# Patient Record
Sex: MALE | Race: WHITE | NOT HISPANIC OR LATINO | Employment: OTHER | URBAN - METROPOLITAN AREA
[De-identification: names, ages, dates, MRNs, and addresses within clinical notes are randomized per-mention and may not be internally consistent; named-entity substitution may affect disease eponyms.]

---

## 2018-10-02 ENCOUNTER — HOSPITAL ENCOUNTER (EMERGENCY)
Facility: HOSPITAL | Age: 58
Discharge: HOME | End: 2018-10-02
Attending: EMERGENCY MEDICINE
Payer: COMMERCIAL

## 2018-10-02 VITALS
TEMPERATURE: 97 F | WEIGHT: 160 LBS | RESPIRATION RATE: 18 BRPM | DIASTOLIC BLOOD PRESSURE: 79 MMHG | SYSTOLIC BLOOD PRESSURE: 118 MMHG | HEART RATE: 71 BPM | HEIGHT: 71 IN | BODY MASS INDEX: 22.4 KG/M2 | OXYGEN SATURATION: 98 %
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
## 2018-10-02 DIAGNOSIS — S42.001A CLOSED DISPLACED FRACTURE OF RIGHT CLAVICLE, UNSPECIFIED PART OF CLAVICLE, INITIAL ENCOUNTER: Primary | ICD-10-CM

## 2018-10-02 PROCEDURE — 23500 CLTX CLAVICULAR FX W/O MNPJ: CPT

## 2018-10-02 PROCEDURE — 73030 X-RAY EXAM OF SHOULDER: CPT | Mod: 26,RT,, | Performed by: RADIOLOGY

## 2018-10-02 PROCEDURE — 73030 X-RAY EXAM OF SHOULDER: CPT | Mod: TC,FY,RT

## 2018-10-02 PROCEDURE — 99284 EMERGENCY DEPT VISIT MOD MDM: CPT | Mod: 25

## 2018-10-02 PROCEDURE — 63600175 PHARM REV CODE 636 W HCPCS: Performed by: EMERGENCY MEDICINE

## 2018-10-02 PROCEDURE — 96372 THER/PROPH/DIAG INJ SC/IM: CPT

## 2018-10-02 RX ORDER — TRAMADOL HYDROCHLORIDE 50 MG/1
50 TABLET ORAL EVERY 6 HOURS PRN
Qty: 12 TABLET | Refills: 0 | Status: SHIPPED | OUTPATIENT
Start: 2018-10-02 | End: 2018-10-12

## 2018-10-02 RX ORDER — GABAPENTIN 300 MG/1
300 CAPSULE ORAL 3 TIMES DAILY
COMMUNITY
End: 2020-01-02

## 2018-10-02 RX ORDER — KETOROLAC TROMETHAMINE 30 MG/ML
60 INJECTION, SOLUTION INTRAMUSCULAR; INTRAVENOUS
Status: COMPLETED | OUTPATIENT
Start: 2018-10-02 | End: 2018-10-02

## 2018-10-02 RX ADMIN — KETOROLAC TROMETHAMINE 60 MG: 30 INJECTION, SOLUTION INTRAMUSCULAR at 02:10

## 2018-10-02 NOTE — ED NOTES
DISCHARGE INSTRUCTIONS GIVEN, DISCUSSED MEDIATIONS AND FOLLOW UP CARE, PATIENT VERBALIZED UNDERSTANDING, NO QUESTIONS OR COMPLAINTS AT TIME, ENCOURAGED TO RETURN FOR NEW OR WORSENING SYMPTOMS. PATIENT ASSISTED TO REGISTRATION VIA SID/MAKSIM EPPS RN

## 2018-10-02 NOTE — ED PROVIDER NOTES
Encounter Date: 10/2/2018       History     Chief Complaint   Patient presents with    Shoulder Pain     trip and fall c/o R shoulder pain     Patient then returned chief complaint of right shoulder pain.  Patient stated fell an endotracheal was having severe pain.  Patient stated pain was an aching sensation rated a 10 on a scale of 1-10.  Patient stated worse with movement.  Patient stated pain it on relief would not moving.  Denies any other injuries.  Denies any head or neck pain.  Patient denies any numbness or tingling that is 8 able to move his hand.          Review of patient's allergies indicates:   Allergen Reactions    Penicillins      Past Medical History:   Diagnosis Date    Neuropathy      History reviewed. No pertinent surgical history.  History reviewed. No pertinent family history.  Social History     Tobacco Use    Smoking status: Current Every Day Smoker     Packs/day: 1.00     Types: Cigarettes   Substance Use Topics    Alcohol use: Yes     Alcohol/week: 3.6 oz     Types: 6 Cans of beer per week     Comment: daily    Drug use: No     Review of Systems   Constitutional: Negative for fever.   HENT: Negative for sore throat.    Respiratory: Negative for shortness of breath.    Cardiovascular: Negative for chest pain.   Gastrointestinal: Negative for nausea.   Genitourinary: Negative for dysuria.   Musculoskeletal: Positive for arthralgias and joint swelling. Negative for back pain.   Skin: Negative for rash.   Neurological: Negative for weakness.   Hematological: Does not bruise/bleed easily.       Physical Exam     Initial Vitals   BP Pulse Resp Temp SpO2   10/02/18 0114 10/02/18 0114 10/02/18 0114 10/02/18 0117 10/02/18 0114   118/79 71 18 97.4 °F (36.3 °C) 98 %      MAP       --                Physical Exam    Constitutional: Vital signs are normal. He appears well-developed and well-nourished. He appears cachectic. He is Obese .   HENT:   Head: Normocephalic and atraumatic.   Eyes: Lids  are normal. Lids are everted and swept, no foreign bodies found.   Neck: Trachea normal, normal range of motion and phonation normal. Neck supple.   Cardiovascular: Normal rate, regular rhythm and normal pulses.   Abdominal: Soft. Normal appearance and bowel sounds are normal.   Musculoskeletal: Normal range of motion.   Pain swelling tenderness to right clavicle decreased range of motion of the right upper extremity secondary to pain of the clavicle.  Good sensory motor good 2 point discrimination good capillary refill   Lymphadenopathy:        Head (right side): No submental, no tonsillar, no preauricular, no posterior auricular and no occipital adenopathy present.        Head (left side): No tonsillar, no preauricular and no posterior auricular adenopathy present.     He has no cervical adenopathy.        Right cervical: No deep cervical adenopathy present.       Left cervical: No superficial cervical adenopathy present.   Neurological: He is alert and oriented to person, place, and time. No cranial nerve deficit or sensory deficit. GCS eye subscore is 4. GCS verbal subscore is 5. GCS motor subscore is 6.   Skin: Skin is warm, dry and intact.   Psychiatric: He has a normal mood and affect. His speech is normal and behavior is normal. Cognition and memory are normal.         ED Course   Procedures  Labs Reviewed - No data to display       Imaging Results    None       X-Rays:   Independently Interpreted Readings:   Other Readings:  Right shoulder x-ray read by me independently shows of the mid clavicler fracture    Medical Decision Making:   Initial Assessment:   Patient did present by ambulance after a fall at home was having pain to his right shoulder stated worse with movement.  X-rays were done to rule out possible fractures or dislocations which did show a fractured clavicle  ED Management:  Patient was given a shot of Toradol secondary to not having a ride home was discharged home with Ultram placed in a  jack with follow-up with Orthopedics                      Clinical Impression:   The primary encounter diagnosis was Closed displaced fracture of right clavicle, unspecified part of clavicle, initial encounter. A diagnosis of Fall was also pertinent to this visit.                             Joe Jenkins DO  10/02/18 0146

## 2018-10-03 ENCOUNTER — HOSPITAL ENCOUNTER (OUTPATIENT)
Dept: RADIOLOGY | Facility: HOSPITAL | Age: 58
Discharge: HOME | End: 2018-10-03
Attending: ORTHOPAEDIC SURGERY
Payer: COMMERCIAL

## 2018-10-03 ENCOUNTER — OFFICE VISIT (OUTPATIENT)
Dept: ORTHOPEDICS | Facility: CLINIC | Age: 58
End: 2018-10-03
Payer: COMMERCIAL

## 2018-10-03 VITALS
BODY MASS INDEX: 22.4 KG/M2 | HEART RATE: 74 BPM | HEIGHT: 71 IN | SYSTOLIC BLOOD PRESSURE: 126 MMHG | WEIGHT: 160 LBS | DIASTOLIC BLOOD PRESSURE: 79 MMHG

## 2018-10-03 DIAGNOSIS — S42.024A CLOSED NONDISPLACED FRACTURE OF SHAFT OF RIGHT CLAVICLE, INITIAL ENCOUNTER: ICD-10-CM

## 2018-10-03 DIAGNOSIS — M25.511 RIGHT SHOULDER PAIN, UNSPECIFIED CHRONICITY: Primary | ICD-10-CM

## 2018-10-03 DIAGNOSIS — M25.511 RIGHT SHOULDER PAIN, UNSPECIFIED CHRONICITY: ICD-10-CM

## 2018-10-03 DIAGNOSIS — M19.011 ARTHRITIS OF RIGHT ACROMIOCLAVICULAR JOINT: ICD-10-CM

## 2018-10-03 PROCEDURE — 99999 PR PBB SHADOW E&M-NEW PATIENT-LVL III: CPT | Mod: PBBFAC,,, | Performed by: ORTHOPAEDIC SURGERY

## 2018-10-03 PROCEDURE — 23500 CLTX CLAVICULAR FX W/O MNPJ: CPT | Mod: RT,S$GLB,, | Performed by: ORTHOPAEDIC SURGERY

## 2018-10-03 PROCEDURE — 73030 X-RAY EXAM OF SHOULDER: CPT | Mod: 26,RT,, | Performed by: RADIOLOGY

## 2018-10-03 PROCEDURE — 3008F BODY MASS INDEX DOCD: CPT | Mod: S$GLB,,, | Performed by: ORTHOPAEDIC SURGERY

## 2018-10-03 PROCEDURE — 73030 X-RAY EXAM OF SHOULDER: CPT | Mod: TC,PN,RT

## 2018-10-03 PROCEDURE — 99203 OFFICE O/P NEW LOW 30 MIN: CPT | Mod: 57,S$GLB,, | Performed by: ORTHOPAEDIC SURGERY

## 2018-10-03 RX ORDER — GABAPENTIN 300 MG/1
300 CAPSULE ORAL 3 TIMES DAILY
COMMUNITY
End: 2020-01-02

## 2018-10-03 NOTE — PROGRESS NOTES
Subjective:      Patient ID: Kev Bauer is a 58 y.o. male.    Chief Complaint: Pain of the Right Shoulder    Referring Provider: No referring provider defined for this encounter.    HPI:  Mr. Bauer is a 58-year-old right-hand-dominant male who presented today for evaluation of 2 days of right shoulder pain which began after he fell on his shoulder while walking on 10/02/2018.  He has not taken NSAIDs done physical therapy nor had injections.  Abduction increases his pain and apparently nothing improves it.    Past medical history:  Diverticulitis  Headaches  Peripheral neuropathy    Past surgical history:  Colonoscopy    Review of patient's allergies indicates:  No Known Allergies    Social History     Occupational History         Tobacco Use    Smoking status: Current Every Day Smoker     Packs/day: 2.00     Types: Cigarettes   Substance and Sexual Activity    Alcohol use: Yes     Alcohol/week: 25.2 oz     Types: 42 Cans of beer per week    Drug use: Not on file    Sexual activity: Not on file      Family History   Problem Relation Age of Onset    Lung cancer Mother     Cancer Father     Heart disease Father     Cancer Brother        Previous Hospitalizations:  Diverticulitis.    ROS:   Review of Systems   Constitution: Negative for chills and fever.   Eyes: Negative for blurred vision.   Cardiovascular: Negative for chest pain.   Respiratory: Negative for cough.    Endocrine: Negative for polydipsia.   Hematologic/Lymphatic: Does not bruise/bleed easily.   Skin: Negative for itching.   Musculoskeletal: Positive for falls.   Gastrointestinal: Negative for heartburn.   Genitourinary: Negative for flank pain.   Neurological: Positive for headaches. Negative for numbness and seizures.   Psychiatric/Behavioral: Negative for depression.   Allergic/Immunologic: Negative for environmental allergies.           Objective:      Physical Exam:   General: AAOx3.  No acute distress  HEENT:  Normocephalic, PEARLA EOMI, fair Dentition  Neck: Supple, No JVD  Chest: Symetric, equal excursion on inspiration  Abdomen: Soft NTND  Vascular:  Pulses intact and equal bilaterally.  Capillary refill less than 3 seconds and equal bilaterally  Neurologic:  Pinprick and soft touch intact and equal bilaterally  Integment:  No ecchymosis, no errythema  Extremity:  Shoulder:  Decreased motion right shoulder secondary tenderness. Forward flexion/abduction right shoulder 0/90°, left shoulder 0/175 degrees. Internal rotation right shoulder 0 5 left shoulder T12.  External rotation 0/10° right shoulder, 0/20 degrees left shoulder.  Tender with motion right shoulder.  Nontender over the acromioclavicular joint.  Nontender with palpation bicipital groove.  Tender over the midshaft of the right clavicle.  Swelling at the mid shaft of the right clavicle.  Radiography:  Personally reviewed x-rays of the right shoulder completed on 10/03/2018 which showed a relatively nondisplaced midshaft clavicle fracture and acromioclavicular arthritis.      Assessment:       Impression:      1. Closed nondisplaced fracture of shaft of right clavicle, initial encounter    2. Arthritis of right acromioclavicular joint          Plan:       1.  Discussed physical examination and radiographic findings with the patient. Kev understands that he has a fracture of the clavicle of his right shoulder and he can be treated conservatively.  He understands if he displaces his fracture or develops a nonunion surgical intervention could be warranted.  2.  Place in a figure-of-eight splint.  3.  Patient understands he must maintain his shoulders in the  attention posture.  4.  Norco 7.5/325, 1 p.o. q.4-6 hours p.r.n. pain, number 30, refill 0.  5.  One-handed activities with the left hand only.  6.  Apply ice to right shoulder patient was instructed on how to apply ice.  7.  Follow up in 1 month with an x-ray of the right clavicle.

## 2018-10-17 ENCOUNTER — TELEPHONE (OUTPATIENT)
Dept: ORTHOPEDICS | Facility: CLINIC | Age: 58
End: 2018-10-17

## 2018-10-17 NOTE — TELEPHONE ENCOUNTER
Called patient to inform him that his disability paperwork is ready for  at the [place] location and that Dr. Mccann documented for him to return to work on 10/18/2018. I cannot fax disability paperwork because the section of the paperwork that requires his signature for me to share his medical information is not signed. No answer at phone number [phone] and no voicemail set up. Unable to contact patient.

## 2018-10-18 ENCOUNTER — TELEPHONE (OUTPATIENT)
Dept: ORTHOPEDICS | Facility: CLINIC | Age: 58
End: 2018-10-18

## 2018-10-18 NOTE — TELEPHONE ENCOUNTER
Called patient to inform him that his disability paperwork is ready for  at the [place] location and that Dr. Mccann documented for him to return to work on 10/18/2018. I cannot fax disability paperwork because the section of the paperwork that requires his signature for me to share his medical information is not signed.     No answer at phone number [phone] and no voicemail set up. Unable to contact patient X 2 attempts.

## 2018-10-19 ENCOUNTER — TELEPHONE (OUTPATIENT)
Dept: ORTHOPEDICS | Facility: CLINIC | Age: 58
End: 2018-10-19

## 2018-10-19 NOTE — TELEPHONE ENCOUNTER
Called patient to inform him that his disability paperwork is ready for  at the [place] location and that Dr. Mccann documented for him to return to work on 10/18/2018. I cannot fax disability paperwork because the section of the paperwork that requires his signature for me to share his medical information is not signed.      No answer at phone number [phone] and no voicemail set up. Unable to contact patient X 3 attempts.     I called [facility] at number ([phone]) and explained that I cannot fax patient's information to them because patient did not sign the form that was attached to the paperwork that allows me to fax his information. I explained that I cannot reach the patient. I was informed that patient's case has been extended to Oct 24, 2018.    Letter explaining situation and paperwork mailed to patient today.

## 2019-12-22 ENCOUNTER — HOSPITAL ENCOUNTER (EMERGENCY)
Facility: HOSPITAL | Age: 59
Discharge: HOME | End: 2019-12-22
Attending: EMERGENCY MEDICINE

## 2019-12-22 VITALS
BODY MASS INDEX: 23.03 KG/M2 | DIASTOLIC BLOOD PRESSURE: 60 MMHG | HEIGHT: 72 IN | RESPIRATION RATE: 20 BRPM | SYSTOLIC BLOOD PRESSURE: 111 MMHG | HEART RATE: 88 BPM | WEIGHT: 170 LBS | TEMPERATURE: 99 F | OXYGEN SATURATION: 94 %

## 2019-12-22 DIAGNOSIS — S82.201A CLOSED FRACTURE OF RIGHT TIBIA AND FIBULA, INITIAL ENCOUNTER: Primary | ICD-10-CM

## 2019-12-22 DIAGNOSIS — S82.401A CLOSED FRACTURE OF RIGHT TIBIA AND FIBULA, INITIAL ENCOUNTER: Primary | ICD-10-CM

## 2019-12-22 DIAGNOSIS — R52 PAIN: ICD-10-CM

## 2019-12-22 LAB
ALBUMIN SERPL BCP-MCNC: 4.4 G/DL (ref 3.5–5.2)
ALP SERPL-CCNC: 50 U/L (ref 55–135)
ALT SERPL W/O P-5'-P-CCNC: 25 U/L (ref 10–44)
ANION GAP SERPL CALC-SCNC: 8 MMOL/L (ref 8–16)
AST SERPL-CCNC: 29 U/L (ref 10–40)
BASOPHILS # BLD AUTO: 0.08 K/UL (ref 0–0.2)
BASOPHILS NFR BLD: 0.6 % (ref 0–1.9)
BILIRUB SERPL-MCNC: 0.8 MG/DL (ref 0.1–1)
BUN SERPL-MCNC: 10 MG/DL (ref 6–20)
CALCIUM SERPL-MCNC: 8.5 MG/DL (ref 8.7–10.5)
CHLORIDE SERPL-SCNC: 103 MMOL/L (ref 95–110)
CO2 SERPL-SCNC: 26 MMOL/L (ref 23–29)
CREAT SERPL-MCNC: 0.8 MG/DL (ref 0.5–1.4)
DIFFERENTIAL METHOD: ABNORMAL
EOSINOPHIL # BLD AUTO: 0.1 K/UL (ref 0–0.5)
EOSINOPHIL NFR BLD: 0.9 % (ref 0–8)
ERYTHROCYTE [DISTWIDTH] IN BLOOD BY AUTOMATED COUNT: 12.1 % (ref 11.5–14.5)
EST. GFR  (AFRICAN AMERICAN): >60 ML/MIN/1.73 M^2
EST. GFR  (NON AFRICAN AMERICAN): >60 ML/MIN/1.73 M^2
GLUCOSE SERPL-MCNC: 114 MG/DL (ref 70–110)
HCT VFR BLD AUTO: 44.4 % (ref 40–54)
HGB BLD-MCNC: 15.2 G/DL (ref 14–18)
IMM GRANULOCYTES # BLD AUTO: 0.07 K/UL (ref 0–0.04)
IMM GRANULOCYTES NFR BLD AUTO: 0.5 % (ref 0–0.5)
LYMPHOCYTES # BLD AUTO: 1.8 K/UL (ref 1–4.8)
LYMPHOCYTES NFR BLD: 12.4 % (ref 18–48)
MCH RBC QN AUTO: 33.6 PG (ref 27–31)
MCHC RBC AUTO-ENTMCNC: 34.2 G/DL (ref 32–36)
MCV RBC AUTO: 98 FL (ref 82–98)
MONOCYTES # BLD AUTO: 1 K/UL (ref 0.3–1)
MONOCYTES NFR BLD: 6.8 % (ref 4–15)
NEUTROPHILS # BLD AUTO: 11.2 K/UL (ref 1.8–7.7)
NEUTROPHILS NFR BLD: 78.8 % (ref 38–73)
NRBC BLD-RTO: 0 /100 WBC
PLATELET # BLD AUTO: 267 K/UL (ref 150–350)
PMV BLD AUTO: 8.8 FL (ref 9.2–12.9)
POTASSIUM SERPL-SCNC: 4.3 MMOL/L (ref 3.5–5.1)
PROT SERPL-MCNC: 7.1 G/DL (ref 6–8.4)
RBC # BLD AUTO: 4.53 M/UL (ref 4.6–6.2)
SODIUM SERPL-SCNC: 137 MMOL/L (ref 136–145)
WBC # BLD AUTO: 14.26 K/UL (ref 3.9–12.7)

## 2019-12-22 PROCEDURE — 90715 TDAP VACCINE 7 YRS/> IM: CPT | Performed by: EMERGENCY MEDICINE

## 2019-12-22 PROCEDURE — 25000003 PHARM REV CODE 250: Performed by: EMERGENCY MEDICINE

## 2019-12-22 PROCEDURE — 99284 EMERGENCY DEPT VISIT MOD MDM: CPT | Mod: 25

## 2019-12-22 PROCEDURE — 80053 COMPREHEN METABOLIC PANEL: CPT

## 2019-12-22 PROCEDURE — 85025 COMPLETE CBC W/AUTO DIFF WBC: CPT

## 2019-12-22 PROCEDURE — 63600175 PHARM REV CODE 636 W HCPCS: Performed by: EMERGENCY MEDICINE

## 2019-12-22 PROCEDURE — 29515 APPLICATION SHORT LEG SPLINT: CPT | Mod: RT

## 2019-12-22 PROCEDURE — 96374 THER/PROPH/DIAG INJ IV PUSH: CPT

## 2019-12-22 PROCEDURE — 90471 IMMUNIZATION ADMIN: CPT | Performed by: EMERGENCY MEDICINE

## 2019-12-22 PROCEDURE — 96375 TX/PRO/DX INJ NEW DRUG ADDON: CPT

## 2019-12-22 RX ORDER — ONDANSETRON 2 MG/ML
4 INJECTION INTRAMUSCULAR; INTRAVENOUS
Status: COMPLETED | OUTPATIENT
Start: 2019-12-22 | End: 2019-12-22

## 2019-12-22 RX ORDER — HYDROCODONE BITARTRATE AND ACETAMINOPHEN 10; 325 MG/1; MG/1
1 TABLET ORAL
Status: COMPLETED | OUTPATIENT
Start: 2019-12-22 | End: 2019-12-22

## 2019-12-22 RX ORDER — HYDROMORPHONE HYDROCHLORIDE 1 MG/ML
1 INJECTION, SOLUTION INTRAMUSCULAR; INTRAVENOUS; SUBCUTANEOUS
Status: COMPLETED | OUTPATIENT
Start: 2019-12-22 | End: 2019-12-22

## 2019-12-22 RX ORDER — HYDROCODONE BITARTRATE AND ACETAMINOPHEN 10; 325 MG/1; MG/1
1 TABLET ORAL EVERY 6 HOURS PRN
Qty: 12 TABLET | Refills: 0 | Status: SHIPPED | OUTPATIENT
Start: 2019-12-22 | End: 2019-12-23 | Stop reason: SDUPTHER

## 2019-12-22 RX ADMIN — HYDROMORPHONE HYDROCHLORIDE 1 MG: 1 INJECTION, SOLUTION INTRAMUSCULAR; INTRAVENOUS; SUBCUTANEOUS at 12:12

## 2019-12-22 RX ADMIN — ONDANSETRON HYDROCHLORIDE 4 MG: 2 INJECTION, SOLUTION INTRAMUSCULAR; INTRAVENOUS at 12:12

## 2019-12-22 RX ADMIN — HYDROCODONE BITARTRATE AND ACETAMINOPHEN 1 TABLET: 10; 325 TABLET ORAL at 02:12

## 2019-12-22 RX ADMIN — CLOSTRIDIUM TETANI TOXOID ANTIGEN (FORMALDEHYDE INACTIVATED), CORYNEBACTERIUM DIPHTHERIAE TOXOID ANTIGEN (FORMALDEHYDE INACTIVATED), BORDETELLA PERTUSSIS TOXOID ANTIGEN (GLUTARALDEHYDE INACTIVATED), BORDETELLA PERTUSSIS FILAMENTOUS HEMAGGLUTININ ANTIGEN (FORMALDEHYDE INACTIVATED), BORDETELLA PERTUSSIS PERTACTIN ANTIGEN, AND BORDETELLA PERTUSSIS FIMBRIAE 2/3 ANTIGEN 0.5 ML: 5; 2; 2.5; 5; 3; 5 INJECTION, SUSPENSION INTRAMUSCULAR at 02:12

## 2019-12-22 NOTE — DISCHARGE INSTRUCTIONS
See Dr. Grover [facility] [address], Monday morning. Elevate, non weight bearing, crutches.

## 2019-12-22 NOTE — ED PROVIDER NOTES
Encounter Date: 12/22/2019       History   No chief complaint on file.    Chief complaint is right lower extremity pain. This patient states he was standing up at his house made a turn felt a pop and now has a fracture of his lower extremity.  EMT state it is a closed fracture at the present time he is getting to his bed and I will examine him shortly.  The patient does have a history of neuropathy takes gabapentin for that.  He does drink alcohol as well.  States he had 6 drinks tonight.  He has received 200 mcg of fentanyl prior to me seeing him in the ER.        Review of patient's allergies indicates:   Allergen Reactions    Penicillins      Past Medical History:   Diagnosis Date    Neuropathy      No past surgical history on file.  Family History   Problem Relation Age of Onset    Lung cancer Mother     Cancer Father     Heart disease Father     Cancer Brother      Social History     Tobacco Use    Smoking status: Current Every Day Smoker     Packs/day: 2.00     Types: Cigarettes   Substance Use Topics    Alcohol use: Yes     Alcohol/week: 42.0 standard drinks     Types: 42 Cans of beer per week     Comment: daily    Drug use: Not on file     Review of Systems   Constitutional: Negative for chills and fever.   HENT: Negative for ear pain, rhinorrhea and sore throat.    Eyes: Negative for pain and visual disturbance.   Respiratory: Negative for cough and shortness of breath.    Cardiovascular: Negative for chest pain and palpitations.   Gastrointestinal: Negative for abdominal pain, constipation, diarrhea, nausea and vomiting.   Genitourinary: Negative for dysuria, frequency, hematuria and urgency.   Musculoskeletal: Negative for back pain, joint swelling and myalgias.        Patient with obvious deformity right lower extremity just above the ankle   Skin: Negative for rash.   Neurological: Negative for dizziness, seizures, weakness and headaches.   Psychiatric/Behavioral: Negative for dysphoric mood.  The patient is not nervous/anxious.        Physical Exam     Initial Vitals   BP Pulse Resp Temp SpO2   -- -- -- -- --      MAP       --         Physical Exam    Nursing note and vitals reviewed.  Constitutional: He appears well-developed and well-nourished.   HENT:   Head: Normocephalic and atraumatic.   Eyes: Conjunctivae, EOM and lids are normal. Pupils are equal, round, and reactive to light.   Neck: Trachea normal. Neck supple. No thyroid mass present.   Cardiovascular: Normal rate, regular rhythm and normal heart sounds.   Pulmonary/Chest: Breath sounds normal. No respiratory distress.   Abdominal: Soft. Bowel sounds are normal. There is no tenderness.   Musculoskeletal: Normal range of motion.   Patient with obvious deformity approximately 3 in above the ankle joint.  Bones easily movable unfortunately.  It is a closed fracture however.  Good pulses to his foot.  He will be put in a splint.   Neurological: He is alert and oriented to person, place, and time. He has normal strength and normal reflexes. No cranial nerve deficit or sensory deficit.   Skin: Skin is warm and dry.   Psychiatric: He has a normal mood and affect. His speech is normal and behavior is normal. Judgment and thought content normal.         ED Course   Procedures  Labs Reviewed - No data to display       Imaging Results    None                       Attending Attestation:             Attending ED Notes:   I sent pictures to Dr. Grover.  He called me and  the case was discussed.  States that we should put the patient in a splint and since it is a closed fracture he can be given pain meds and will follow up in the clinic on Monday morning.  [place] [id number].Binh Beebe MD  2:04 AM 12/22/2019    The patient was therefore splinted and discharged                          Clinical Impression:       ICD-10-CM ICD-9-CM   1. Closed fracture of right tibia and fibula, initial encounter S82.201A 823.82    S82.401A    2.  Pain R52 780.96                             Binh Beebe MD  12/22/19 0204       Binh Beebe MD  01/05/20 0543

## 2019-12-22 NOTE — ED TRIAGE NOTES
Kev Bauer, a 59 y.o. male presents to the ED w/ complaint of right anterior shin deformity s/t fall. Pt states that he was walking and his whole body turned but not his right lower extremity. Denies any falls or blood thinners. 10/10 pain. Deformity noted. Chronic numbness/tingling s/t neuropathy. AAOx4.    Triage note:  Chief Complaint   Patient presents with    Leg Pain     RLE deformity     Review of patient's allergies indicates:   Allergen Reactions    Penicillins      Past Medical History:   Diagnosis Date    Neuropathy         No risk alerts present

## 2019-12-23 ENCOUNTER — TELEPHONE (OUTPATIENT)
Dept: ORTHOPEDICS | Facility: CLINIC | Age: 59
End: 2019-12-23

## 2019-12-23 DIAGNOSIS — S42.024A CLOSED NONDISPLACED FRACTURE OF SHAFT OF RIGHT CLAVICLE, INITIAL ENCOUNTER: Primary | ICD-10-CM

## 2019-12-23 DIAGNOSIS — S42.024A CLOSED NONDISPLACED FRACTURE OF SHAFT OF RIGHT CLAVICLE, INITIAL ENCOUNTER: ICD-10-CM

## 2019-12-23 RX ORDER — HYDROCODONE BITARTRATE AND ACETAMINOPHEN 10; 325 MG/1; MG/1
1 TABLET ORAL EVERY 6 HOURS PRN
Qty: 12 TABLET | Refills: 0 | Status: SHIPPED | OUTPATIENT
Start: 2019-12-23 | End: 2019-12-23 | Stop reason: SDUPTHER

## 2019-12-23 RX ORDER — HYDROCODONE BITARTRATE AND ACETAMINOPHEN 10; 325 MG/1; MG/1
1 TABLET ORAL EVERY 6 HOURS PRN
Qty: 12 TABLET | Refills: 0 | Status: ON HOLD | OUTPATIENT
Start: 2019-12-23 | End: 2019-12-27 | Stop reason: HOSPADM

## 2019-12-23 RX ORDER — HYDROCODONE BITARTRATE AND ACETAMINOPHEN 10; 325 MG/1; MG/1
1 TABLET ORAL EVERY 6 HOURS PRN
Qty: 12 TABLET | Refills: 0 | Status: CANCELLED | OUTPATIENT
Start: 2019-12-23 | End: 2019-12-26

## 2019-12-23 NOTE — TELEPHONE ENCOUNTER
----- Message from Jennie Cerna sent at 12/23/2019  8:25 AM CST -----  Type:  Same Day Appointment Request    Caller is requesting a same day appointment.  Caller declined first available appointment listed below.      Name of Caller:  Kev  When is the first available appointment?  12/26  Symptoms:  2 broken bones in his right leg  Best Call Back Number: [phone]  Additional Information:  Seen in ER on Saturday night.  Thank you!

## 2019-12-23 NOTE — TELEPHONE ENCOUNTER
Patient contacted clinic (sent to wrong provider), stating that his pharmacy listed does not have enough pain medication to fill his prescription. Patient is asking to have a new prescription sent to [facility] [address].     Please review/sign if agreeable. Phyllis Moyer LPN

## 2019-12-23 NOTE — TELEPHONE ENCOUNTER
----- Message from Stuart Cuevas sent at 12/23/2019  3:16 PM CST -----  Contact: Lupe (Friend/care giver)  Type: Needs Medical Advice    Who Called:  Lupe Herrera Call Back Number: [phone]  Additional Information: Caller would like to discuss sending HYDROcodone-acetaminophen (NORCO)  mg per tablet to another pharmacy as the current pharmacy does not have the medication. Please call to advise. Thanks!

## 2019-12-23 NOTE — TELEPHONE ENCOUNTER
Called and spoke with patient and advised that his injury is not something that Dr. Osborne will be able to see and treat, He verbalized understanding.

## 2019-12-23 NOTE — TELEPHONE ENCOUNTER
Patient has a Right Tib/Fib Fracture. Fall occurred 12/22/19. Patient was seen in the ER and d/c home. Patient is schedueld to see Dr. Sanford 12/26/19 for surgical intervention. Patient was sent home on Norco 10-325mg qty:12. Every 6 hours. Per Dr. Sanford, wishes to prescribe the patient 12 more pills. Patient notified of Rx.  Will route to Tanika Arora NP.

## 2019-12-23 NOTE — TELEPHONE ENCOUNTER
----- Message from Jennie Cerna sent at 12/23/2019  9:37 AM CST -----  Type:  Sooner Apoointment Request    Caller is requesting a sooner appointment.  Caller declined first available appointment listed below.  Caller will not accept being placed on the waitlist and is requesting a message be sent to doctor.    Name of Caller:  Kev  When is the first available appointment?  12/30  Symptoms:  2 broken bones in his right leg  Best Call Back Number: [phone]  Additional Information:  Seen in ER on Saturday night.  Thank you!

## 2019-12-26 ENCOUNTER — OFFICE VISIT (OUTPATIENT)
Dept: ORTHOPEDICS | Facility: CLINIC | Age: 59
End: 2019-12-26
Payer: COMMERCIAL

## 2019-12-26 ENCOUNTER — HOSPITAL ENCOUNTER (OUTPATIENT)
Dept: RADIOLOGY | Facility: HOSPITAL | Age: 59
Discharge: HOME | End: 2019-12-26
Attending: ORTHOPAEDIC SURGERY
Payer: COMMERCIAL

## 2019-12-26 VITALS
HEART RATE: 118 BPM | DIASTOLIC BLOOD PRESSURE: 85 MMHG | WEIGHT: 170 LBS | BODY MASS INDEX: 23.03 KG/M2 | SYSTOLIC BLOOD PRESSURE: 144 MMHG | HEIGHT: 72 IN

## 2019-12-26 DIAGNOSIS — Z01.818 PRE-OP TESTING: ICD-10-CM

## 2019-12-26 DIAGNOSIS — Z01.818 PRE-OP TESTING: Primary | ICD-10-CM

## 2019-12-26 DIAGNOSIS — S82.301A CLOSED FRACTURE OF DISTAL END OF RIGHT TIBIA, UNSPECIFIED FRACTURE MORPHOLOGY, INITIAL ENCOUNTER: ICD-10-CM

## 2019-12-26 PROCEDURE — 71046 XR CHEST PA AND LATERAL: ICD-10-PCS | Mod: 26,,, | Performed by: RADIOLOGY

## 2019-12-26 PROCEDURE — 99999 PR PBB SHADOW E&M-EST. PATIENT-LVL IV: CPT | Mod: PBBFAC,,, | Performed by: ORTHOPAEDIC SURGERY

## 2019-12-26 PROCEDURE — 99214 OFFICE O/P EST MOD 30 MIN: CPT | Mod: S$GLB,,, | Performed by: ORTHOPAEDIC SURGERY

## 2019-12-26 PROCEDURE — 71046 X-RAY EXAM CHEST 2 VIEWS: CPT | Mod: TC,FY,PO

## 2019-12-26 PROCEDURE — 99214 PR OFFICE/OUTPT VISIT, EST, LEVL IV, 30-39 MIN: ICD-10-PCS | Mod: S$GLB,,, | Performed by: ORTHOPAEDIC SURGERY

## 2019-12-26 PROCEDURE — 99999 PR PBB SHADOW E&M-EST. PATIENT-LVL IV: ICD-10-PCS | Mod: PBBFAC,,, | Performed by: ORTHOPAEDIC SURGERY

## 2019-12-26 PROCEDURE — 3008F PR BODY MASS INDEX (BMI) DOCUMENTED: ICD-10-PCS | Mod: CPTII,S$GLB,, | Performed by: ORTHOPAEDIC SURGERY

## 2019-12-26 PROCEDURE — 71046 X-RAY EXAM CHEST 2 VIEWS: CPT | Mod: 26,,, | Performed by: RADIOLOGY

## 2019-12-26 PROCEDURE — 3008F BODY MASS INDEX DOCD: CPT | Mod: CPTII,S$GLB,, | Performed by: ORTHOPAEDIC SURGERY

## 2019-12-26 NOTE — LETTER
December 26, 2019      Reg Grover II, MD  [address] [place] [address] Dr Rodrigo [address]A [id number]           [facility]  [address] [place] [address]  Phone: [phone]          Patient: Kev Bauer   MR Number: [id number]   YOB: 1960   Date of Visit: 12/26/2019       Dear Dr. Reg Grover II:    Thank you for referring Kev Bauer to me for evaluation. Attached you will find relevant portions of my assessment and plan of care.    If you have questions, please do not hesitate to call me. I look forward to following Kev Bauer along with you.    Sincerely,    True Sanford MD    Enclosure  CC:  No Recipients    If you would like to receive this communication electronically, please contact [email or web address] or [phone] to request more information on [employer] Link access.    For providers and/or their staff who would like to refer a patient to [facility], please contact us through our one-stop-shop provider referral line, [facility], at [phone].    If you feel you have received this communication in error or would no longer like to receive these types of communications, please e-mail [email or web address]

## 2019-12-26 NOTE — H&P
Chief Complaint   Patient presents with    Right Lower Leg - Pain, Injury     DOI 12/22/19       HPI:    This is a 59 y.o. who presents today complaining of right lower leg pain for 5 days after fall. He was seen initially in [place] and splinted. Pain is aching. No numbness or tingling. No associated signs or symptoms.      Past Medical History:   Diagnosis Date    Neuropathy       No past surgical history on file.   No current facility-administered medications on file prior to visit.      Current Outpatient Medications on File Prior to Visit   Medication Sig Dispense Refill    HYDROcodone-acetaminophen (NORCO)  mg per tablet Take 1 tablet by mouth every 6 (six) hours as needed for Pain. 12 tablet 0    gabapentin (NEURONTIN) 300 MG capsule Take 300 mg by mouth 3 (three) times daily.      gabapentin (NEURONTIN) 300 MG capsule Take 300 mg by mouth 3 (three) times daily.        Review of patient's allergies indicates:   Allergen Reactions    Penicillins       Family History not pertinent   Social History     Socioeconomic History    Marital status: Unknown     Spouse name: Not on file    Number of children: Not on file    Years of education: Not on file    Highest education level: Not on file   Occupational History    Not on file   Social Needs    Financial resource strain: Not on file    Food insecurity:     Worry: Not on file     Inability: Not on file    Transportation needs:     Medical: Not on file     Non-medical: Not on file   Tobacco Use    Smoking status: Current Every Day Smoker     Packs/day: 2.00     Types: Cigarettes    Smokeless tobacco: Never Used   Substance and Sexual Activity    Alcohol use: Yes     Alcohol/week: 6.0 standard drinks     Types: 6 Cans of beer per week     Comment: daily    Drug use: Not on file    Sexual activity: Not on file   Lifestyle    Physical activity:     Days per week: Not on file     Minutes per session: Not on file    Stress: Not on file    Relationships    Social connections:     Talks on phone: Not on file     Gets together: Not on file     Attends Confucianism service: Not on file     Active member of club or organization: Not on file     Attends meetings of clubs or organizations: Not on file     Relationship status: Not on file   Other Topics Concern    Not on file   Social History Narrative    ** Merged History Encounter **              Review of Systems:   Constitutional:  Denies fever or chills    Eyes:  Denies change in visual acuity    HENT:  Denies nasal congestion or sore throat    Respiratory:  Denies cough or shortness of breath    Cardiovascular:  Denies chest pain or edema    GI:  Denies abdominal pain, nausea, vomiting, bloody stools or diarrhea    :  Denies dysuria    Integument:  Denies rash    Neurologic:  Denies headache, focal weakness or sensory changes    Endocrine:  Denies polyuria or polydipsia    Lymphatic:  Denies swollen glands    Psychiatric:  Denies depression or anxiety       Physical Exam:    Constitutional:  Well developed, well nourished, no acute distress, non-toxic appearance    Integument:  Well hydrated, no rash    Lymphatic:  No lymphadenopathy noted    Neurologic:  Alert & oriented x 3,     Psychiatric:  Speech and behavior appropriate    Gi: abdomen soft  Eyes: EOMI   Left lower extremity  Exam performed same as contralateral side and is normal    Right lower extremity  Posterior splint in place. NVI distally. Compartments soft above. Moving toes. Skin intact.       X-rays were performed today, personally reviewed by me and findings discussed with the patient.   3 views of the right tibia and fibula show comminuted distal tibial shaft fracture with displaced fibula fracture      Pre-op testing  -     CBC auto differential; Future; Expected date: 12/26/2019  -     Comprehensive metabolic panel; Future; Expected date: 12/26/2019  -     Cancel: X-Ray Chest 1 View; Future; Expected date: 12/26/2019    Closed  fracture of distal end of right tibia, unspecified fracture morphology, initial encounter  -     Diet NPO; Standing  -     Case Request Operating Room: INSERTION, INTRAMEDULLARY SANDRA TIBIA, ORIF, FRACTURE, FIBULA          I had a long discussion with the patient regarding the benefits and risks of right tibia intramedullary nailing with ORIF right fibula. They voiced understanding and wish to proceed with surgery today. He has been NPO since midnight.  Consents signed in clinic.

## 2019-12-30 ENCOUNTER — TELEPHONE (OUTPATIENT)
Dept: ORTHOPEDICS | Facility: CLINIC | Age: 59
End: 2019-12-30

## 2019-12-30 NOTE — TELEPHONE ENCOUNTER
----- Message from Hebert Call sent at 12/30/2019  8:52 AM CST -----  Contact: Oren CONNOLLY with [facility]    questions on orders, please call

## 2019-12-30 NOTE — TELEPHONE ENCOUNTER
Called Oren back. He states that his office needs dressing change orders written and faxed over. They were not included in the original Home Health Order. Advised that Dr. Sanford and JENISE Key are in surgery today, that I would get the orders sent over in the morning. Thanks, Samra

## 2019-12-30 NOTE — TELEPHONE ENCOUNTER
Called Oren back. He wanted to know the frequency on dressing changes? Pt has ACE wrap and Cam boot on. Advised to change daily until drainage stops, then can go to every other day. Cleanse area with Chloraprep, apply gauze. Advised to continue to use ACE wrap. Thanks, Samra

## 2019-12-30 NOTE — TELEPHONE ENCOUNTER
----- Message from Artur Richard sent at 12/30/2019 11:05 AM CST -----  Contact: Oren from [facility]  Type: Needs Medical Advice    Who Called:  Oren    Best Call Back Number: [phone]  Additional Information: [facility] needs written orders for wound care for pt. Fax [phone]

## 2019-12-31 ENCOUNTER — TELEPHONE (OUTPATIENT)
Dept: ORTHOPEDICS | Facility: CLINIC | Age: 59
End: 2019-12-31

## 2019-12-31 DIAGNOSIS — S82.301A CLOSED FRACTURE OF DISTAL END OF RIGHT TIBIA, UNSPECIFIED FRACTURE MORPHOLOGY, INITIAL ENCOUNTER: Primary | ICD-10-CM

## 2019-12-31 NOTE — TELEPHONE ENCOUNTER
----- Message from Kell Valverde MA sent at 12/31/2019 10:08 AM CST -----  Contact: ruth [facility]   Need wound care orders   Fax     Call back

## 2020-01-02 ENCOUNTER — TELEPHONE (OUTPATIENT)
Dept: ORTHOPEDICS | Facility: CLINIC | Age: 60
End: 2020-01-02

## 2020-01-02 ENCOUNTER — HOSPITAL ENCOUNTER (EMERGENCY)
Facility: HOSPITAL | Age: 60
Discharge: HOME | End: 2020-01-02
Attending: EMERGENCY MEDICINE
Payer: COMMERCIAL

## 2020-01-02 VITALS
HEIGHT: 72 IN | BODY MASS INDEX: 23.7 KG/M2 | DIASTOLIC BLOOD PRESSURE: 82 MMHG | OXYGEN SATURATION: 97 % | HEART RATE: 73 BPM | WEIGHT: 175 LBS | RESPIRATION RATE: 15 BRPM | TEMPERATURE: 98 F | SYSTOLIC BLOOD PRESSURE: 136 MMHG

## 2020-01-02 DIAGNOSIS — Z98.890 S/P ORIF (OPEN REDUCTION INTERNAL FIXATION) FRACTURE: ICD-10-CM

## 2020-01-02 DIAGNOSIS — L08.9 WOUND INFECTION: ICD-10-CM

## 2020-01-02 DIAGNOSIS — Z87.81 S/P ORIF (OPEN REDUCTION INTERNAL FIXATION) FRACTURE: ICD-10-CM

## 2020-01-02 DIAGNOSIS — L03.115 CELLULITIS OF RIGHT LOWER EXTREMITY: Primary | ICD-10-CM

## 2020-01-02 DIAGNOSIS — L89.90 PRESSURE INJURY OF SKIN, UNSPECIFIED INJURY STAGE, UNSPECIFIED LOCATION: ICD-10-CM

## 2020-01-02 DIAGNOSIS — S82.301A CLOSED FRACTURE OF DISTAL END OF RIGHT TIBIA, UNSPECIFIED FRACTURE MORPHOLOGY, INITIAL ENCOUNTER: Primary | ICD-10-CM

## 2020-01-02 DIAGNOSIS — T14.8XXA WOUND INFECTION: ICD-10-CM

## 2020-01-02 LAB
ALBUMIN SERPL BCP-MCNC: 3.6 G/DL (ref 3.5–5.2)
ALLENS TEST: ABNORMAL
ALP SERPL-CCNC: 85 U/L (ref 55–135)
ALT SERPL W/O P-5'-P-CCNC: 31 U/L (ref 10–44)
ANION GAP SERPL CALC-SCNC: 8 MMOL/L (ref 8–16)
APTT BLDCRRT: 30.7 SEC (ref 21–32)
AST SERPL-CCNC: 25 U/L (ref 10–40)
BASOPHILS # BLD AUTO: 0.08 K/UL (ref 0–0.2)
BASOPHILS NFR BLD: 0.8 % (ref 0–1.9)
BILIRUB SERPL-MCNC: 1.1 MG/DL (ref 0.1–1)
BILIRUB UR QL STRIP: NEGATIVE
BUN SERPL-MCNC: 9 MG/DL (ref 6–20)
CALCIUM SERPL-MCNC: 8.7 MG/DL (ref 8.7–10.5)
CHLORIDE SERPL-SCNC: 104 MMOL/L (ref 95–110)
CLARITY UR: CLEAR
CO2 SERPL-SCNC: 26 MMOL/L (ref 23–29)
COLOR UR: YELLOW
CREAT SERPL-MCNC: 0.9 MG/DL (ref 0.5–1.4)
CRP SERPL-MCNC: 6.36 MG/DL (ref 0–0.75)
DELSYS: ABNORMAL
DIFFERENTIAL METHOD: ABNORMAL
EOSINOPHIL # BLD AUTO: 0.1 K/UL (ref 0–0.5)
EOSINOPHIL NFR BLD: 1.1 % (ref 0–8)
ERYTHROCYTE [DISTWIDTH] IN BLOOD BY AUTOMATED COUNT: 11.9 % (ref 11.5–14.5)
ERYTHROCYTE [SEDIMENTATION RATE] IN BLOOD BY WESTERGREN METHOD: 62 MM/HR (ref 0–10)
EST. GFR  (AFRICAN AMERICAN): >60 ML/MIN/1.73 M^2
EST. GFR  (NON AFRICAN AMERICAN): >60 ML/MIN/1.73 M^2
GLUCOSE SERPL-MCNC: 112 MG/DL (ref 70–110)
GLUCOSE UR QL STRIP: NEGATIVE
HCO3 UR-SCNC: 21.8 MMOL/L (ref 24–28)
HCT VFR BLD AUTO: 32.7 % (ref 40–54)
HGB BLD-MCNC: 10.9 G/DL (ref 14–18)
HGB UR QL STRIP: NEGATIVE
IMM GRANULOCYTES # BLD AUTO: 0.05 K/UL (ref 0–0.04)
IMM GRANULOCYTES NFR BLD AUTO: 0.5 % (ref 0–0.5)
INR PPP: 1.1 (ref 0.8–1.2)
KETONES UR QL STRIP: NEGATIVE
LACTATE SERPL-SCNC: 0.8 MMOL/L (ref 0.5–2.2)
LACTATE SERPL-SCNC: <0.3 MMOL/L (ref 0.5–2.2)
LEUKOCYTE ESTERASE UR QL STRIP: NEGATIVE
LYMPHOCYTES # BLD AUTO: 1.1 K/UL (ref 1–4.8)
LYMPHOCYTES NFR BLD: 10.4 % (ref 18–48)
MCH RBC QN AUTO: 32.6 PG (ref 27–31)
MCHC RBC AUTO-ENTMCNC: 33.3 G/DL (ref 32–36)
MCV RBC AUTO: 98 FL (ref 82–98)
MONOCYTES # BLD AUTO: 1.2 K/UL (ref 0.3–1)
MONOCYTES NFR BLD: 11.4 % (ref 4–15)
NEUTROPHILS # BLD AUTO: 7.7 K/UL (ref 1.8–7.7)
NEUTROPHILS NFR BLD: 75.8 % (ref 38–73)
NITRITE UR QL STRIP: NEGATIVE
NRBC BLD-RTO: 0 /100 WBC
PCO2 BLDA: 33.6 MMHG (ref 35–45)
PH SMN: 7.42 [PH] (ref 7.35–7.45)
PH UR STRIP: >8 [PH] (ref 5–8)
PLATELET # BLD AUTO: 525 K/UL (ref 150–350)
PMV BLD AUTO: 8.9 FL (ref 9.2–12.9)
PO2 BLDA: 84 MMHG (ref 80–100)
POC BE: -3 MMOL/L
POC SATURATED O2: 97 % (ref 95–100)
POC TCO2: 23 MMOL/L (ref 23–27)
POCT GLUCOSE: 106 MG/DL (ref 70–110)
POTASSIUM SERPL-SCNC: 3.9 MMOL/L (ref 3.5–5.1)
PROT SERPL-MCNC: 7.2 G/DL (ref 6–8.4)
PROT UR QL STRIP: NEGATIVE
PROTHROMBIN TIME: 11.9 SEC (ref 9–12.5)
RBC # BLD AUTO: 3.34 M/UL (ref 4.6–6.2)
SAMPLE: ABNORMAL
SITE: ABNORMAL
SODIUM SERPL-SCNC: 138 MMOL/L (ref 136–145)
SP GR UR STRIP: 1.01 (ref 1–1.03)
URN SPEC COLLECT METH UR: ABNORMAL
UROBILINOGEN UR STRIP-ACNC: NEGATIVE EU/DL
WBC # BLD AUTO: 10.09 K/UL (ref 3.9–12.7)

## 2020-01-02 PROCEDURE — 71045 X-RAY EXAM CHEST 1 VIEW: CPT | Mod: TC,FY

## 2020-01-02 PROCEDURE — 86140 C-REACTIVE PROTEIN: CPT

## 2020-01-02 PROCEDURE — 71045 XR CHEST AP PORTABLE: ICD-10-PCS | Mod: 26,,, | Performed by: RADIOLOGY

## 2020-01-02 PROCEDURE — 85651 RBC SED RATE NONAUTOMATED: CPT

## 2020-01-02 PROCEDURE — 71045 X-RAY EXAM CHEST 1 VIEW: CPT | Mod: 26,,, | Performed by: RADIOLOGY

## 2020-01-02 PROCEDURE — 93010 ELECTROCARDIOGRAM REPORT: CPT | Mod: ,,, | Performed by: INTERNAL MEDICINE

## 2020-01-02 PROCEDURE — 85610 PROTHROMBIN TIME: CPT

## 2020-01-02 PROCEDURE — 84145 PROCALCITONIN (PCT): CPT

## 2020-01-02 PROCEDURE — 36600 WITHDRAWAL OF ARTERIAL BLOOD: CPT

## 2020-01-02 PROCEDURE — 99285 EMERGENCY DEPT VISIT HI MDM: CPT | Mod: 25

## 2020-01-02 PROCEDURE — 85025 COMPLETE CBC W/AUTO DIFF WBC: CPT

## 2020-01-02 PROCEDURE — 87040 BLOOD CULTURE FOR BACTERIA: CPT | Mod: 59

## 2020-01-02 PROCEDURE — 81003 URINALYSIS AUTO W/O SCOPE: CPT

## 2020-01-02 PROCEDURE — 93005 ELECTROCARDIOGRAM TRACING: CPT

## 2020-01-02 PROCEDURE — 85730 THROMBOPLASTIN TIME PARTIAL: CPT

## 2020-01-02 PROCEDURE — 99900035 HC TECH TIME PER 15 MIN (STAT)

## 2020-01-02 PROCEDURE — 63600175 PHARM REV CODE 636 W HCPCS: Performed by: EMERGENCY MEDICINE

## 2020-01-02 PROCEDURE — 82803 BLOOD GASES ANY COMBINATION: CPT

## 2020-01-02 PROCEDURE — 80053 COMPREHEN METABOLIC PANEL: CPT

## 2020-01-02 PROCEDURE — 83605 ASSAY OF LACTIC ACID: CPT | Mod: 91

## 2020-01-02 PROCEDURE — 82962 GLUCOSE BLOOD TEST: CPT

## 2020-01-02 PROCEDURE — 36415 COLL VENOUS BLD VENIPUNCTURE: CPT

## 2020-01-02 PROCEDURE — 93010 EKG 12-LEAD: ICD-10-PCS | Mod: ,,, | Performed by: INTERNAL MEDICINE

## 2020-01-02 RX ORDER — SULFAMETHOXAZOLE AND TRIMETHOPRIM 800; 160 MG/1; MG/1
1 TABLET ORAL 2 TIMES DAILY
Qty: 14 TABLET | Refills: 0 | Status: SHIPPED | OUTPATIENT
Start: 2020-01-02 | End: 2020-01-09 | Stop reason: SDUPTHER

## 2020-01-02 RX ORDER — VANCOMYCIN HCL IN 5 % DEXTROSE 1G/250ML
1000 PLASTIC BAG, INJECTION (ML) INTRAVENOUS
Status: COMPLETED | OUTPATIENT
Start: 2020-01-02 | End: 2020-01-02

## 2020-01-02 RX ADMIN — VANCOMYCIN HYDROCHLORIDE 1000 MG: 1 INJECTION, POWDER, LYOPHILIZED, FOR SOLUTION INTRAVENOUS at 02:01

## 2020-01-02 RX ADMIN — SODIUM CHLORIDE 1000 ML: 0.9 INJECTION, SOLUTION INTRAVENOUS at 12:01

## 2020-01-02 NOTE — TELEPHONE ENCOUNTER
Tried calling Genevieve back. No answer and no voicemail. Called Julian back, advised that the DME order was requested by Oren this morning. Relayed to Julian that I have been unable to connect with Genevieve. Advised Julian that they will have to get wound care orders for the heel breakdown from pts PCP, or whoever is treating pt for that. Dr. Sanford is not treating that wound. Thanks, Samra

## 2020-01-02 NOTE — TELEPHONE ENCOUNTER
"Called Oren back. States that pts surgical wounds look good. Pt has "palm size" breakdown of the right heel. Also states that pts boot was full of maggots today when he went to do a dressing change. Asking for orders for a new boot. Oren states that he told his office about the heel breakdown and they are sending nursing out to evaluate pt. He may need to be sent to [facility] for wound care. Oren will keep us updated. Order for new CAM boot sent to [facility]. JENISE Key notified of call from Home Health. Thanks, Samra  "

## 2020-01-02 NOTE — ED TRIAGE NOTES
Pt broke right tib/fib on 12/22, treated at [facility] ED.  Pt had surgical repair completed 12/26 at [facility].  Pt seen by Physical Therapy and Nursing today, wound on heel which is new and maggots were in the ortho boot.  Pt comes here for the infection in right foot and leg.

## 2020-01-02 NOTE — TELEPHONE ENCOUNTER
----- Message from Kell Valverde MA sent at 1/2/2020  9:34 AM CST -----  Contact: MOHSEN Dickson [facility]   Wound care,   Ulcer heal, maggots in ankle boot   Call back

## 2020-01-02 NOTE — ED NOTES
Patient's wounds were redressed with xeroform and non adhesive Telfa and wrapped with an ACE wrap. Patient tolerated well. Patient was assisted into wheelchair and escorted out by nurse.

## 2020-01-02 NOTE — TELEPHONE ENCOUNTER
----- Message from Hung White sent at 1/2/2020  2:53 PM CST -----  Contact: genevieve with [facility]   Type: Needs Medical Advice    Who Called:  Genevievelakia Herrera Call Back Number: [phone]    Additional Information: noted wounds after walking boots removed. Needing wound care orders. Please call to advise

## 2020-01-02 NOTE — TELEPHONE ENCOUNTER
----- Message from Arthur Anne sent at 1/2/2020  3:11 PM CST -----  Contact: Genevieve with [facility]  Type:  Patient Returning Call    Who Called:  Genevieve  Who Left Message for Patient:  Not sure  Does the patient know what this is regarding?:  Returning Genevieve's call  Best Call Back Number:  [phone]  Additional Information:

## 2020-01-03 DIAGNOSIS — Z87.81 S/P ORIF (OPEN REDUCTION INTERNAL FIXATION) FRACTURE: ICD-10-CM

## 2020-01-03 DIAGNOSIS — S82.301A CLOSED FRACTURE OF DISTAL END OF RIGHT TIBIA, UNSPECIFIED FRACTURE MORPHOLOGY, INITIAL ENCOUNTER: Primary | ICD-10-CM

## 2020-01-03 DIAGNOSIS — Z98.890 S/P ORIF (OPEN REDUCTION INTERNAL FIXATION) FRACTURE: ICD-10-CM

## 2020-01-03 LAB — PROCALCITONIN SERPL IA-MCNC: 0.03 NG/ML

## 2020-01-03 RX ORDER — OXYCODONE HYDROCHLORIDE 10 MG/1
10 TABLET ORAL EVERY 4 HOURS PRN
Qty: 44 TABLET | Refills: 0 | Status: SHIPPED | OUTPATIENT
Start: 2020-01-03 | End: 2020-01-03

## 2020-01-03 RX ORDER — OXYCODONE HYDROCHLORIDE 10 MG/1
10 TABLET ORAL EVERY 4 HOURS PRN
Qty: 44 TABLET | Refills: 0 | Status: SHIPPED | OUTPATIENT
Start: 2020-01-03 | End: 2020-01-09

## 2020-01-03 NOTE — TELEPHONE ENCOUNTER
----- Message from Grace Howard sent at 1/3/2020 12:20 PM CST -----  Type: Requesting medication be ordered    Who Called:  Norma ([facility])  Best Call Back Number: [phone]  Additional Information: Patient went to [place] [facility] yesterday/diagnosed with Cellulitis/patient needs refill of pain medication sent to [facility] in [place]/if any questions call back.

## 2020-01-06 NOTE — ED PROVIDER NOTES
Encounter Date: 1/2/2020       History     Chief Complaint   Patient presents with    Wound Infection     59-year-old male with past medical history neuropathy and right tib/fib fracture on 12/22, treated at [facility] ED s/p intramedullary nailing tibia and ORIF fibula on 12/26 at [facility] by Dr. Sanford presents to the ED for evaluation of right lower extremity redness, swelling and pain. Seen by physical therapy and home health today, who advised evaluation of the wound on heel sustained from the ortho boot as well as postoperative erythema and swelling. Home Health contacted Dr. Sanford office, who ordered a new ortho boot.  Denies fever, chills.        Review of patient's allergies indicates:   Allergen Reactions    Penicillins      Past Medical History:   Diagnosis Date    Neuropathy      Past Surgical History:   Procedure Laterality Date    INSERTION OF INTRAMEDULLARY SANDRA Right 12/26/2019    Procedure: INSERTION, INTRAMEDULLARY SANDRA TIBIA;  Surgeon: True Sanford MD;  Location: [facility];  Service: Orthopedics;  Laterality: Right;    ORIF FIBULA FRACTURE Right 12/26/2019    Procedure: ORIF, FRACTURE, FIBULA;  Surgeon: True Sanford MD;  Location: [facility];  Service: Orthopedics;  Laterality: Right;     Family History   Problem Relation Age of Onset    Lung cancer Mother     Cancer Father     Heart disease Father     Cancer Brother      Social History     Tobacco Use    Smoking status: Current Every Day Smoker     Packs/day: 2.00     Types: Cigarettes    Smokeless tobacco: Never Used   Substance Use Topics    Alcohol use: Yes     Alcohol/week: 6.0 standard drinks     Types: 6 Cans of beer per week     Comment: daily    Drug use: Not on file     Review of Systems   Constitutional: Negative for appetite change, chills, diaphoresis, fatigue and fever.   HENT: Negative for congestion, ear pain, rhinorrhea, sinus pressure, sinus pain, sore throat and tinnitus.    Eyes: Negative for photophobia  and visual disturbance.   Respiratory: Negative for cough, chest tightness, shortness of breath and wheezing.    Cardiovascular: Positive for leg swelling. Negative for chest pain and palpitations.   Gastrointestinal: Negative for abdominal pain, constipation, diarrhea, nausea and vomiting.   Endocrine: Negative for cold intolerance, heat intolerance, polydipsia, polyphagia and polyuria.   Genitourinary: Negative for decreased urine volume, difficulty urinating, dysuria, flank pain, frequency, hematuria and urgency.   Musculoskeletal: Negative for arthralgias, back pain, gait problem, joint swelling, myalgias, neck pain and neck stiffness.   Skin: Positive for color change and wound. Negative for pallor and rash.   Allergic/Immunologic: Negative for immunocompromised state.   Neurological: Negative for dizziness, syncope, weakness, light-headedness, numbness and headaches.   Hematological: Negative for adenopathy. Does not bruise/bleed easily.   Psychiatric/Behavioral: Negative for decreased concentration, dysphoric mood and sleep disturbance. The patient is not nervous/anxious.    All other systems reviewed and are negative.      Physical Exam     Initial Vitals [01/02/20 1202]   BP Pulse Resp Temp SpO2   131/67 78 18 98.1 °F (36.7 °C) 98 %      MAP       --         Physical Exam    Nursing note and vitals reviewed.  Constitutional: He appears well-developed and well-nourished. He is not diaphoretic. No distress.   HENT:   Head: Normocephalic and atraumatic.   Right Ear: External ear normal.   Left Ear: External ear normal.   Nose: Nose normal.   Mouth/Throat: Oropharynx is clear and moist.   Eyes: Conjunctivae are normal. Pupils are equal, round, and reactive to light. No scleral icterus.   Neck: Normal range of motion. Neck supple. No JVD present.   Cardiovascular: Normal rate, regular rhythm, normal heart sounds and intact distal pulses.   Pulmonary/Chest: Breath sounds normal. No respiratory distress. He has no  wheezes. He has no rhonchi. He has no rales. He exhibits no tenderness.   Abdominal: Soft. Bowel sounds are normal. He exhibits no distension. There is no tenderness. There is no rebound and no guarding.   Musculoskeletal: Normal range of motion. He exhibits no edema or tenderness.   Lymphadenopathy:     He has no cervical adenopathy.   Neurological: He is alert and oriented to person, place, and time. GCS score is 15. GCS eye subscore is 4. GCS verbal subscore is 5. GCS motor subscore is 6.   Skin: Skin is warm and dry. Capillary refill takes less than 2 seconds. No rash and no abscess noted. There is erythema. No pallor.   Psychiatric: He has a normal mood and affect. His behavior is normal. Judgment and thought content normal.         ED Course   Procedures  Labs Reviewed   CBC W/ AUTO DIFFERENTIAL - Abnormal; Notable for the following components:       Result Value    RBC 3.34 (*)     Hemoglobin 10.9 (*)     Hematocrit 32.7 (*)     Mean Corpuscular Hemoglobin 32.6 (*)     Platelets 525 (*)     MPV 8.9 (*)     Immature Grans (Abs) 0.05 (*)     Mono # 1.2 (*)     Gran% 75.8 (*)     Lymph% 10.4 (*)     All other components within normal limits   COMPREHENSIVE METABOLIC PANEL - Abnormal; Notable for the following components:    Glucose 112 (*)     Total Bilirubin 1.1 (*)     All other components within normal limits   LACTIC ACID, PLASMA - Abnormal; Notable for the following components:    Lactate (Lactic Acid) <0.3 (*)     All other components within normal limits   URINALYSIS, REFLEX TO URINE CULTURE - Abnormal; Notable for the following components:    pH, UA >8.0 (*)     All other components within normal limits    Narrative:     Preferred Collection Type->Urine, Clean Catch   C-REACTIVE PROTEIN - Abnormal; Notable for the following components:    CRP 6.36 (*)     All other components within normal limits   SEDIMENTATION RATE - Abnormal; Notable for the following components:    Sed Rate 62 (*)     All other  components within normal limits   ISTAT PROCEDURE - Abnormal; Notable for the following components:    POC PCO2 33.6 (*)     POC HCO3 21.8 (*)     All other components within normal limits   CULTURE, BLOOD    Narrative:     Aerobic and anaerobic   CULTURE, BLOOD    Narrative:     Aerobic and anaerobic   LACTIC ACID, PLASMA   APTT   PROTIME-INR   PROCALCITONIN   SEDIMENTATION RATE   C-REACTIVE PROTEIN   POCT GLUCOSE     EKG Readings: (Independently Interpreted)   Initial Reading: No STEMI. Rhythm: Normal Sinus Rhythm. Heart Rate: 74. Ectopy: PACs. Conduction: Normal. ST Segments: Normal ST Segments. T Waves: Normal. Axis: Normal. Clinical Impression: Normal Sinus Rhythm with PACs     ECG Results          EKG 12-lead (Final result)  Result time 01/03/20 20:43:10    Final result by Interface, Lab In [facility] (01/03/20 20:43:10)                 Narrative:    Test Reason : T14.8XXA,L08.9,    Vent. Rate : 074 BPM     Atrial Rate : 074 BPM     P-R Int : 156 ms          QRS Dur : 082 ms      QT Int : 404 ms       P-R-T Axes : 063 025 023 degrees     QTc Int : 448 ms    Sinus rhythm with Premature atrial complexes  Otherwise normal ECG  No previous ECGs available  Confirmed by Nickolas Meredith MD ([id number]) on 1/3/2020 8:43:04 PM    Referred By: [email or web address]   SELF           Confirmed By:Nickolas Meredith MD                            Imaging Results          X-Ray Chest AP Portable (Final result)  Result time 01/02/20 12:20:11    Final result by Desmond Rebolledo MD (01/02/20 12:20:11)                 Impression:      No acute abnormality.      Electronically signed by: Desmond Rebolledo  Date:    01/02/2020  Time:    12:20             Narrative:    EXAMINATION:  XR CHEST AP PORTABLE    CLINICAL HISTORY:  Sepsis;.    TECHNIQUE:  Single frontal portable view of the chest was performed.    COMPARISON:  12/26/2019.    FINDINGS:  Support devices: None    The lungs are clear, with normal appearance of pulmonary vasculature and no pleural effusion or  pneumothorax.    The cardiac silhouette is normal in size. The hilar and mediastinal contours are unremarkable.    Bones are intact.                              X-Rays:   Independently Interpreted Readings:   Chest X-Ray: Normal heart size.  No infiltrates.  No acute abnormalities.     Medical Decision Making:   Differential Diagnosis:   Cellulitis, postoperative infection, pressure ulcer  ED Management:  Sepsis orders and initiated on arrival given recent procedure, workup unremarkable  Curbside consult to Dr. Sanford with foot is uploaded to chart, recommends outpatient follow-up as scheduled  Will start on empiric therapy  Continue home health for dressing changes  Advised return precautions and instructed to follow up closely                   ED Course as of Jan 06 0258   Thu Jan 02, 2020   1544 [facility] contacted for [facility] consultation regarding wounds/possible cellulitis    [MM]      ED Course User Index  [MM] Cristin Graf MD                Clinical Impression:       ICD-10-CM ICD-9-CM   1. Cellulitis of right lower extremity L03.115 682.6   2. Wound infection T14.8XXA 958.3    L08.9    3. S/P ORIF (open reduction internal fixation) fracture Z98.890 V45.89    Z87.81 V15.51   4. Pressure injury of skin, unspecified injury stage, unspecified location L89.90 707.00     707.20         Disposition:   Disposition: Discharged  Condition: Stable                     Cristin Graf MD  01/06/20 8139

## 2020-01-07 LAB
BACTERIA BLD CULT: NORMAL
BACTERIA BLD CULT: NORMAL

## 2020-01-08 DIAGNOSIS — Z87.81 S/P ORIF (OPEN REDUCTION INTERNAL FIXATION) FRACTURE: ICD-10-CM

## 2020-01-08 DIAGNOSIS — Z98.890 S/P ORIF (OPEN REDUCTION INTERNAL FIXATION) FRACTURE: ICD-10-CM

## 2020-01-08 DIAGNOSIS — S82.301A CLOSED FRACTURE OF DISTAL END OF RIGHT TIBIA, UNSPECIFIED FRACTURE MORPHOLOGY, INITIAL ENCOUNTER: Primary | ICD-10-CM

## 2020-01-09 ENCOUNTER — OFFICE VISIT (OUTPATIENT)
Dept: ORTHOPEDICS | Facility: CLINIC | Age: 60
End: 2020-01-09
Payer: COMMERCIAL

## 2020-01-09 ENCOUNTER — HOSPITAL ENCOUNTER (OUTPATIENT)
Dept: RADIOLOGY | Facility: HOSPITAL | Age: 60
Discharge: HOME | End: 2020-01-09
Attending: ORTHOPAEDIC SURGERY
Payer: COMMERCIAL

## 2020-01-09 VITALS
DIASTOLIC BLOOD PRESSURE: 72 MMHG | WEIGHT: 175 LBS | HEIGHT: 72 IN | BODY MASS INDEX: 23.7 KG/M2 | SYSTOLIC BLOOD PRESSURE: 122 MMHG | HEART RATE: 68 BPM

## 2020-01-09 DIAGNOSIS — S82.301A CLOSED FRACTURE OF DISTAL END OF RIGHT TIBIA, UNSPECIFIED FRACTURE MORPHOLOGY, INITIAL ENCOUNTER: ICD-10-CM

## 2020-01-09 DIAGNOSIS — L03.115 CELLULITIS OF RIGHT LOWER EXTREMITY: ICD-10-CM

## 2020-01-09 DIAGNOSIS — Z87.81 S/P ORIF (OPEN REDUCTION INTERNAL FIXATION) FRACTURE: ICD-10-CM

## 2020-01-09 DIAGNOSIS — Z98.890 S/P ORIF (OPEN REDUCTION INTERNAL FIXATION) FRACTURE: ICD-10-CM

## 2020-01-09 PROCEDURE — 73590 XR TIBIA FIBULA 2 VIEW RIGHT: ICD-10-PCS | Mod: 26,RT,, | Performed by: RADIOLOGY

## 2020-01-09 PROCEDURE — 73590 X-RAY EXAM OF LOWER LEG: CPT | Mod: TC,PO,RT

## 2020-01-09 PROCEDURE — 99024 PR POST-OP FOLLOW-UP VISIT: ICD-10-PCS | Mod: S$GLB,,, | Performed by: ORTHOPAEDIC SURGERY

## 2020-01-09 PROCEDURE — 99999 PR PBB SHADOW E&M-EST. PATIENT-LVL III: CPT | Mod: PBBFAC,,, | Performed by: ORTHOPAEDIC SURGERY

## 2020-01-09 PROCEDURE — 99999 PR PBB SHADOW E&M-EST. PATIENT-LVL III: ICD-10-PCS | Mod: PBBFAC,,, | Performed by: ORTHOPAEDIC SURGERY

## 2020-01-09 PROCEDURE — 99024 POSTOP FOLLOW-UP VISIT: CPT | Mod: S$GLB,,, | Performed by: ORTHOPAEDIC SURGERY

## 2020-01-09 PROCEDURE — 73590 X-RAY EXAM OF LOWER LEG: CPT | Mod: 26,RT,, | Performed by: RADIOLOGY

## 2020-01-09 RX ORDER — OXYCODONE AND ACETAMINOPHEN 10; 325 MG/1; MG/1
1 TABLET ORAL EVERY 6 HOURS PRN
Qty: 44 TABLET | Refills: 0 | Status: SHIPPED | OUTPATIENT
Start: 2020-01-09 | End: 2020-01-28

## 2020-01-09 RX ORDER — SULFAMETHOXAZOLE AND TRIMETHOPRIM 800; 160 MG/1; MG/1
1 TABLET ORAL 2 TIMES DAILY
Qty: 20 TABLET | Refills: 0 | Status: SHIPPED | OUTPATIENT
Start: 2020-01-09 | End: 2020-01-19

## 2020-01-09 RX ORDER — RIFAMPIN 300 MG/1
300 CAPSULE ORAL EVERY 12 HOURS
Qty: 20 CAPSULE | Refills: 0 | Status: SHIPPED | OUTPATIENT
Start: 2020-01-09

## 2020-01-09 NOTE — PROGRESS NOTES
Chief Complaint   Patient presents with    Post-op Evaluation     right IM SANDRA TIBIA: ORIF right fibula 12/26/19       HPI:  59 y.o. returns to clinic today status post right tibia IMN and right fibula ORIF  2 weeks ago. Pain is moderate. Patient is compliant most of the time with restrictions. Home health notified office this week with report of maggots in dressing.     Right lower extremity    Incisions well approximated with staples. No erythema or fluctuance. Overall normal alignment. Mild point TTP about the fracture site. Decreased ROM due to stiffness. Compartments soft. NVI distally.      X-rays were performed today, personally reviewed by me and findings discussed with the patient.  2 views of the right tibia show hardware intact in good postition    Cellulitis of right lower extremity  -     sulfamethoxazole-trimethoprim 800-160mg (BACTRIM DS) 800-160 mg Tab; Take 1 tablet by mouth 2 (two) times daily. for 10 days  Dispense: 20 tablet; Refill: 0    S/P ORIF (open reduction internal fixation) fracture  -     sulfamethoxazole-trimethoprim 800-160mg (BACTRIM DS) 800-160 mg Tab; Take 1 tablet by mouth 2 (two) times daily. for 10 days  Dispense: 20 tablet; Refill: 0    Other orders  -     rifAMpin (RIFADIN) 300 MG capsule; Take 1 capsule (300 mg total) by mouth every 12 (twelve) hours.  Dispense: 20 capsule; Refill: 0  -     oxyCODONE-acetaminophen (PERCOCET)  mg per tablet; Take 1 tablet by mouth every 6 (six) hours as needed.  Dispense: 44 tablet; Refill: 0  -     oxyCODONE-acetaminophen (PERCOCET)  mg per tablet; Take 1 tablet by mouth every 6 (six) hours as needed for Pain.  Dispense: 44 tablet; Refill: 0        Will continue Bactrim and Rifampin. Staples removed, steristrips placed.May increase weight bearing to PWB 50 %, RLE. RTC 4 weeks with repeat Xrays.

## 2020-01-10 ENCOUNTER — TELEPHONE (OUTPATIENT)
Dept: ORTHOPEDICS | Facility: CLINIC | Age: 60
End: 2020-01-10

## 2020-01-10 RX ORDER — OXYCODONE AND ACETAMINOPHEN 10; 325 MG/1; MG/1
1 TABLET ORAL EVERY 6 HOURS PRN
Qty: 44 TABLET | Refills: 0 | Status: SHIPPED | OUTPATIENT
Start: 2020-01-10 | End: 2020-01-28 | Stop reason: SDUPTHER

## 2020-01-10 NOTE — TELEPHONE ENCOUNTER
----- Message from Jennifer Sanon sent at 1/10/2020 10:50 AM CST -----  Contact: self  Pt said wrong medication was filled (oxyCODONE-acetaminophen (PERCOCET)  mg per tablet) is what was supposed to be sent     (mupirocin (BACTROBAN) 2 % ointment) is what was sent    Please advise, can be reached at [phone]

## 2020-01-23 ENCOUNTER — TELEPHONE (OUTPATIENT)
Dept: HOME HEALTH SERVICES | Facility: HOSPITAL | Age: 60
End: 2020-01-23

## 2020-01-24 ENCOUNTER — TELEPHONE (OUTPATIENT)
Dept: ORTHOPEDICS | Facility: CLINIC | Age: 60
End: 2020-01-24

## 2020-01-24 NOTE — TELEPHONE ENCOUNTER
"Spoke with Angie Hodges with [facility]. States lateral incision is "Opening up" now that steri strips are off. Silver dressing placed. Requesting patient come in for an apt. nect week for a wound check. Possible wound care orders may be needed at this time. Will set up apt. For Tuesday, 1/28/20 for 8:45am.  "

## 2020-01-24 NOTE — TELEPHONE ENCOUNTER
----- Message from Mario Alberto Metzger sent at 1/24/2020 10:19 AM CST -----  Contact: [facility]  Ms. Angie Hodges called in and wanted to speak with someone at the office regarding the patient right lateral incision opening up and now the patient has a wound. Ms.Mary Hodges is requesting wound care orders and can be reached at    [phone]

## 2020-01-28 ENCOUNTER — TELEPHONE (OUTPATIENT)
Dept: ORTHOPEDICS | Facility: CLINIC | Age: 60
End: 2020-01-28

## 2020-01-28 ENCOUNTER — OFFICE VISIT (OUTPATIENT)
Dept: ORTHOPEDICS | Facility: CLINIC | Age: 60
End: 2020-01-28
Payer: COMMERCIAL

## 2020-01-28 VITALS
HEIGHT: 72 IN | SYSTOLIC BLOOD PRESSURE: 127 MMHG | DIASTOLIC BLOOD PRESSURE: 84 MMHG | WEIGHT: 175 LBS | BODY MASS INDEX: 23.7 KG/M2 | HEART RATE: 95 BPM

## 2020-01-28 DIAGNOSIS — S82.251D: Primary | ICD-10-CM

## 2020-01-28 PROCEDURE — 99024 PR POST-OP FOLLOW-UP VISIT: ICD-10-PCS | Mod: S$GLB,,, | Performed by: ORTHOPAEDIC SURGERY

## 2020-01-28 PROCEDURE — 99024 POSTOP FOLLOW-UP VISIT: CPT | Mod: S$GLB,,, | Performed by: ORTHOPAEDIC SURGERY

## 2020-01-28 PROCEDURE — 99999 PR PBB SHADOW E&M-EST. PATIENT-LVL III: ICD-10-PCS | Mod: PBBFAC,,, | Performed by: ORTHOPAEDIC SURGERY

## 2020-01-28 PROCEDURE — 99999 PR PBB SHADOW E&M-EST. PATIENT-LVL III: CPT | Mod: PBBFAC,,, | Performed by: ORTHOPAEDIC SURGERY

## 2020-01-28 RX ORDER — TRAMADOL HYDROCHLORIDE 50 MG/1
50 TABLET ORAL EVERY 6 HOURS PRN
Qty: 44 TABLET | Refills: 0 | Status: SHIPPED | OUTPATIENT
Start: 2020-01-28 | End: 2020-02-11 | Stop reason: SDUPTHER

## 2020-01-28 RX ORDER — DOXYCYCLINE 100 MG/1
100 CAPSULE ORAL 2 TIMES DAILY
Qty: 20 CAPSULE | Refills: 0 | Status: SHIPPED | OUTPATIENT
Start: 2020-01-28

## 2020-01-28 RX ORDER — OXYCODONE AND ACETAMINOPHEN 10; 325 MG/1; MG/1
1 TABLET ORAL EVERY 6 HOURS PRN
Qty: 44 TABLET | Refills: 0 | Status: SHIPPED | OUTPATIENT
Start: 2020-01-28 | End: 2020-02-11 | Stop reason: SDUPTHER

## 2020-01-28 NOTE — TELEPHONE ENCOUNTER
----- Message from Arleen Richmond sent at 1/28/2020  8:43 AM CST -----  Contact: Kev mary  Type: Needs Medical Advice    Who Called:  Kev  Best Call Back Number: [phone]  Additional Information: Pt called to adv he is on his way. [place] get there quick. If any problems pls call him

## 2020-01-28 NOTE — PROGRESS NOTES
Chief Complaint   Patient presents with    Wound Check     IM SANDRA tibia 12/26/19       HPI:  59 y.o. returns to clinic today status post right tibia IMN and right fibula ORIF  4 weeks ago. Pain is mild to moderate. Patient is compliant most of the time with restrictions.     Right lower extremity   0.5 cm opening to distal portion of lateral ankle incision with firm eschar,remaining incisions otherwise well approximated. No drainage,erythema or fluctuance. Overall normal alignment. Mild point TTP about the fracture site. Decreased ROM due to stiffness. Compartments soft. NVI distally.      Closed disp comminuted fx of shaft of right tibia with routine healing    Other orders  -     doxycycline (VIBRAMYCIN) 100 MG Cap; Take 1 capsule (100 mg total) by mouth 2 (two) times daily.  Dispense: 20 capsule; Refill: 0  -     oxyCODONE-acetaminophen (PERCOCET)  mg per tablet; Take 1 tablet by mouth every 6 (six) hours as needed for Pain.  Dispense: 44 tablet; Refill: 0  -     traMADol (ULTRAM) 50 mg tablet; Take 1 tablet (50 mg total) by mouth every 6 (six) hours as needed.  Dispense: 44 tablet; Refill: 0        Dressing changed with Foam Ag and wrapped with ace.  Encouraged to elevate frequently. Continue home genaro. Return 2 weeks with repeat Xrays.

## 2020-01-29 ENCOUNTER — TELEPHONE (OUTPATIENT)
Dept: HOME HEALTH SERVICES | Facility: HOSPITAL | Age: 60
End: 2020-01-29

## 2020-02-05 PROBLEM — S82.251D: Status: ACTIVE | Noted: 2020-02-05

## 2020-02-06 DIAGNOSIS — Z87.81 S/P ORIF (OPEN REDUCTION INTERNAL FIXATION) FRACTURE: ICD-10-CM

## 2020-02-06 DIAGNOSIS — S82.251D: Primary | ICD-10-CM

## 2020-02-06 DIAGNOSIS — Z98.890 S/P ORIF (OPEN REDUCTION INTERNAL FIXATION) FRACTURE: ICD-10-CM

## 2020-02-11 ENCOUNTER — HOSPITAL ENCOUNTER (OUTPATIENT)
Dept: RADIOLOGY | Facility: HOSPITAL | Age: 60
Discharge: HOME | End: 2020-02-11
Attending: ORTHOPAEDIC SURGERY
Payer: COMMERCIAL

## 2020-02-11 ENCOUNTER — OFFICE VISIT (OUTPATIENT)
Dept: ORTHOPEDICS | Facility: CLINIC | Age: 60
End: 2020-02-11
Payer: COMMERCIAL

## 2020-02-11 VITALS
DIASTOLIC BLOOD PRESSURE: 80 MMHG | WEIGHT: 175.06 LBS | HEIGHT: 72 IN | SYSTOLIC BLOOD PRESSURE: 126 MMHG | BODY MASS INDEX: 23.71 KG/M2 | HEART RATE: 98 BPM

## 2020-02-11 DIAGNOSIS — S82.251D: ICD-10-CM

## 2020-02-11 DIAGNOSIS — Z98.890 S/P ORIF (OPEN REDUCTION INTERNAL FIXATION) FRACTURE: ICD-10-CM

## 2020-02-11 DIAGNOSIS — S82.251D: Primary | ICD-10-CM

## 2020-02-11 DIAGNOSIS — Z87.81 S/P ORIF (OPEN REDUCTION INTERNAL FIXATION) FRACTURE: ICD-10-CM

## 2020-02-11 PROCEDURE — 99024 PR POST-OP FOLLOW-UP VISIT: ICD-10-PCS | Mod: S$GLB,,, | Performed by: ORTHOPAEDIC SURGERY

## 2020-02-11 PROCEDURE — 73590 XR TIBIA FIBULA 2 VIEW RIGHT: ICD-10-PCS | Mod: 26,RT,, | Performed by: RADIOLOGY

## 2020-02-11 PROCEDURE — 99999 PR PBB SHADOW E&M-EST. PATIENT-LVL III: ICD-10-PCS | Mod: PBBFAC,,, | Performed by: ORTHOPAEDIC SURGERY

## 2020-02-11 PROCEDURE — 99024 POSTOP FOLLOW-UP VISIT: CPT | Mod: S$GLB,,, | Performed by: ORTHOPAEDIC SURGERY

## 2020-02-11 PROCEDURE — 73590 X-RAY EXAM OF LOWER LEG: CPT | Mod: 26,RT,, | Performed by: RADIOLOGY

## 2020-02-11 PROCEDURE — 99999 PR PBB SHADOW E&M-EST. PATIENT-LVL III: CPT | Mod: PBBFAC,,, | Performed by: ORTHOPAEDIC SURGERY

## 2020-02-11 PROCEDURE — 73590 X-RAY EXAM OF LOWER LEG: CPT | Mod: TC,PO,RT

## 2020-02-11 RX ORDER — METHOCARBAMOL 750 MG/1
750 TABLET, FILM COATED ORAL 4 TIMES DAILY PRN
Qty: 44 TABLET | Refills: 0 | Status: SHIPPED | OUTPATIENT
Start: 2020-02-11 | End: 2020-02-21

## 2020-02-11 RX ORDER — OXYCODONE AND ACETAMINOPHEN 10; 325 MG/1; MG/1
1 TABLET ORAL EVERY 6 HOURS PRN
Qty: 44 TABLET | Refills: 0 | Status: SHIPPED | OUTPATIENT
Start: 2020-02-11

## 2020-02-11 RX ORDER — TRAMADOL HYDROCHLORIDE 50 MG/1
50 TABLET ORAL EVERY 6 HOURS PRN
Qty: 44 TABLET | Refills: 0 | Status: SHIPPED | OUTPATIENT
Start: 2020-02-11

## 2020-02-11 NOTE — PROGRESS NOTES
Chief Complaint   Patient presents with    Post-op Evaluation     4 week post op, IM luis tibial ORIF right fibula 12/26/19       HPI:  59 y.o. returns to clinic today status post  right tibia IMN and right fibula ORIF 6 weeks ago. Pain is moderate. Patient is compliant most of the time with restrictions.     Right lower extremity   Incisions healed. No drainage,erythema or fluctuance. Overall normal alignment. Mild point TTP about the fracture site. Decreased ROM due to stiffness. Compartments soft. NVI distally.       X-rays were performed today, personally reviewed by me and findings discussed with the patient.  3 views of the right tibia show hardware intact in good alignment.     Closed disp comminuted fx of shaft of right tibia with routine healing  -     oxyCODONE-acetaminophen (PERCOCET)  mg per tablet; Take 1 tablet by mouth every 6 (six) hours as needed for Pain (not relieved by Ultram ( tramadol)).  Dispense: 44 tablet; Refill: 0  -     traMADol (ULTRAM) 50 mg tablet; Take 1 tablet (50 mg total) by mouth every 6 (six) hours as needed.  Dispense: 44 tablet; Refill: 0  -     methocarbamol (ROBAXIN) 750 MG Tab; Take 1 tablet (750 mg total) by mouth 4 (four) times daily as needed.  Dispense: 44 tablet; Refill: 0        May start WBAT RLE. RTC 4 weeks with repeat Xrays.

## 2020-02-12 ENCOUNTER — TELEPHONE (OUTPATIENT)
Dept: ORTHOPEDICS | Facility: CLINIC | Age: 60
End: 2020-02-12

## 2020-02-12 NOTE — TELEPHONE ENCOUNTER
Patient requesting office note from 2/11/20 be faxed to [facility]. Notified patient we also received a new disability form today and will get it filled out and fax that back as well. Patient verbalized understanding.

## 2020-02-12 NOTE — TELEPHONE ENCOUNTER
----- Message from Shiraz Castro sent at 2/12/2020 10:59 AM CST -----  Type: Needs Medical Advice    Who Called:  Patient    Best Call Back Number: [phone]  Additional Information: Patient states that he would like a callback from Brittni regarding his recent appointment notes faxed to [facility] at [phone].

## 2020-03-04 DIAGNOSIS — S82.251D: Primary | ICD-10-CM

## 2020-03-04 DIAGNOSIS — Z87.81 S/P ORIF (OPEN REDUCTION INTERNAL FIXATION) FRACTURE: ICD-10-CM

## 2020-03-04 DIAGNOSIS — Z98.890 S/P ORIF (OPEN REDUCTION INTERNAL FIXATION) FRACTURE: ICD-10-CM

## 2020-03-10 ENCOUNTER — OFFICE VISIT (OUTPATIENT)
Dept: ORTHOPEDICS | Facility: CLINIC | Age: 60
End: 2020-03-10
Payer: COMMERCIAL

## 2020-03-10 ENCOUNTER — HOSPITAL ENCOUNTER (OUTPATIENT)
Dept: RADIOLOGY | Facility: HOSPITAL | Age: 60
Discharge: HOME | End: 2020-03-10
Attending: ORTHOPAEDIC SURGERY
Payer: COMMERCIAL

## 2020-03-10 VITALS
DIASTOLIC BLOOD PRESSURE: 73 MMHG | WEIGHT: 175 LBS | HEIGHT: 72 IN | BODY MASS INDEX: 23.7 KG/M2 | SYSTOLIC BLOOD PRESSURE: 123 MMHG | HEART RATE: 101 BPM

## 2020-03-10 DIAGNOSIS — Z87.81 S/P ORIF (OPEN REDUCTION INTERNAL FIXATION) FRACTURE: ICD-10-CM

## 2020-03-10 DIAGNOSIS — Z98.890 S/P ORIF (OPEN REDUCTION INTERNAL FIXATION) FRACTURE: ICD-10-CM

## 2020-03-10 DIAGNOSIS — S82.251D: Primary | ICD-10-CM

## 2020-03-10 DIAGNOSIS — S82.251D: ICD-10-CM

## 2020-03-10 PROCEDURE — 99024 POSTOP FOLLOW-UP VISIT: CPT | Mod: S$GLB,,, | Performed by: ORTHOPAEDIC SURGERY

## 2020-03-10 PROCEDURE — 99999 PR PBB SHADOW E&M-EST. PATIENT-LVL III: CPT | Mod: PBBFAC,,, | Performed by: ORTHOPAEDIC SURGERY

## 2020-03-10 PROCEDURE — 73590 XR TIBIA FIBULA 2 VIEW RIGHT: ICD-10-PCS | Mod: 26,RT,, | Performed by: RADIOLOGY

## 2020-03-10 PROCEDURE — 73590 X-RAY EXAM OF LOWER LEG: CPT | Mod: 26,RT,, | Performed by: RADIOLOGY

## 2020-03-10 PROCEDURE — 99999 PR PBB SHADOW E&M-EST. PATIENT-LVL III: ICD-10-PCS | Mod: PBBFAC,,, | Performed by: ORTHOPAEDIC SURGERY

## 2020-03-10 PROCEDURE — 99024 PR POST-OP FOLLOW-UP VISIT: ICD-10-PCS | Mod: S$GLB,,, | Performed by: ORTHOPAEDIC SURGERY

## 2020-03-10 PROCEDURE — 73590 X-RAY EXAM OF LOWER LEG: CPT | Mod: TC,PO,RT

## 2020-03-10 NOTE — PROGRESS NOTES
Chief Complaint   Patient presents with    Post-op Evaluation     right IM SANDRA tibia: ORIF right fibula 12/26/19       HPI:  59 y.o. returns to clinic today status post right tibia IMN and right fibula ORIF 6 weeks ago. Pain is moderate. Patient is compliant most of the time with restrictions.      Right lower extremity   Incisions healed. No drainage,erythema or fluctuance. Overall normal alignment. Mild point TTP about the fracture site. Decreased ROM due to stiffness. Compartments soft. NVI distally.        X-rays were performed today, personally reviewed by me and findings discussed with the patient.  3 views of the right tibia show hardware intact in good alignment.         Closed disp comminuted fx of shaft of right tibia with routine healing    S/P ORIF (open reduction internal fixation) fracture        Continue WBAT. He may return to work full duty on 3/14.  RTC 6 weeks with repeat Xrays.

## 2020-04-06 ENCOUNTER — TELEPHONE (OUTPATIENT)
Dept: ORTHOPEDICS | Facility: CLINIC | Age: 60
End: 2020-04-06

## 2020-04-06 ENCOUNTER — NURSE TRIAGE (OUTPATIENT)
Dept: ADMINISTRATIVE | Facility: CLINIC | Age: 60
End: 2020-04-06

## 2020-04-06 DIAGNOSIS — M25.571 RIGHT ANKLE PAIN, UNSPECIFIED CHRONICITY: ICD-10-CM

## 2020-04-06 DIAGNOSIS — Z98.890 S/P ORIF (OPEN REDUCTION INTERNAL FIXATION) FRACTURE: ICD-10-CM

## 2020-04-06 DIAGNOSIS — Z87.81 S/P ORIF (OPEN REDUCTION INTERNAL FIXATION) FRACTURE: ICD-10-CM

## 2020-04-06 DIAGNOSIS — S82.251D: Primary | ICD-10-CM

## 2020-04-06 NOTE — TELEPHONE ENCOUNTER
Reason for Disposition   Message left on unidentified voice mail. Phone number verified.    Additional Information   Negative: Caller has already spoken with the PCP (or office), and has no further questions   Negative: Caller has already spoken with another triager and has no further questions   Negative: Caller has already spoken with another triager or PCP (or office), and has further questions and triager able to answer questions.   Negative: Busy signal.  First attempt to contact caller.  Follow-up call scheduled within 15 minutes.   Negative: No answer.  First attempt to contact caller.  Follow-up call scheduled within 15 minutes.   Negative: Message left on identified voicemail    Protocols used: NO CONTACT OR DUPLICATE CONTACT CALL-A-[place]

## 2020-04-06 NOTE — TELEPHONE ENCOUNTER
Called pt. Received message from Triage nurse that pt was going to ED. I have not seen any ED records yet. Asked pt if he was going? Pt states that he decided not to go. States that he wants to wait and see how he does overnight. Scheduled pt appt with Dr. Sanford for tomorrow morning and ordered xray. Screened pt and advised on how to enter building.

## 2020-04-06 NOTE — TELEPHONE ENCOUNTER
----- Message from Tammy Bashir sent at 4/6/2020  7:52 AM CDT -----  Contact: self  Sent to on call nurse.    Kev fell last night and twisted his right ankle, and right around the area of the screws is all swollen and hurting states it out of one to ten the pain is a ten.  Call back at [phone] (home).  Thanks

## 2020-04-06 NOTE — TELEPHONE ENCOUNTER
Pt states he fell yesterday and twisted right ankle. C/o pain a 10/10 and swelling to ankle. States he can barely ambulate. Pt recently had surgery on right tibia. Per triage protocol, pt to go to ED or urgent care now for evaluation. Pt states he will go to [facility].   Reason for Disposition   Followed an injury   Can't stand (bear weight) or walk (e.g., 4 steps)    Additional Information   Negative: Major bleeding (actively dripping or spurting) that can't be stopped   Negative: Amputation or bone sticking through the skin   Negative: Looks like a dislocated joint (crooked or deformed)   Negative: Sounds like a life-threatening emergency to the triager   Negative: Wound looks infected   Negative: Caused by an animal bite   Negative: Puncture wound of foot   Negative: Toe injury is the main symptom   Negative: Cast problems or questions   Negative: Bullet, stabbed by knife or other serious penetrating wound    Protocols used: ANKLE PAIN-A-[place], ANKLE AND FOOT INJURY-A-[place]

## 2020-04-16 ENCOUNTER — TELEPHONE (OUTPATIENT)
Dept: ORTHOPEDICS | Facility: CLINIC | Age: 60
End: 2020-04-16

## 2020-04-16 NOTE — TELEPHONE ENCOUNTER
Called pt to ask COVID-19 and travel screening questions for upcoming appt. Advised to come to driveway and entrance #3 for appt. Advised pt will be only one allowed into clinic for appt. Thanks, Samra